# Patient Record
Sex: FEMALE | Race: WHITE | HISPANIC OR LATINO | Employment: FULL TIME | URBAN - METROPOLITAN AREA
[De-identification: names, ages, dates, MRNs, and addresses within clinical notes are randomized per-mention and may not be internally consistent; named-entity substitution may affect disease eponyms.]

---

## 2017-04-28 ENCOUNTER — GENERIC CONVERSION - ENCOUNTER (OUTPATIENT)
Dept: OTHER | Facility: OTHER | Age: 37
End: 2017-04-28

## 2017-05-11 ENCOUNTER — ALLSCRIPTS OFFICE VISIT (OUTPATIENT)
Dept: OTHER | Facility: OTHER | Age: 37
End: 2017-05-11

## 2017-12-11 ENCOUNTER — ALLSCRIPTS OFFICE VISIT (OUTPATIENT)
Dept: OTHER | Facility: OTHER | Age: 37
End: 2017-12-11

## 2017-12-11 ENCOUNTER — GENERIC CONVERSION - ENCOUNTER (OUTPATIENT)
Dept: OTHER | Facility: OTHER | Age: 37
End: 2017-12-11

## 2017-12-12 NOTE — CONSULTS
Assessment  1  Hyperlipemia (272 4) (E78 5)   2  Vasovagal syncope (780 2) (R55)   3  Family history of hyperlipidemia (V18 19) (Z83 49) : Mother    Plan  Health Maintenance, Multiple sclerosis    · EKG/ECG- POC; Status:Complete;   Done: 24ICN3196 01:14PM   Perform: In Office; Last Updated By:Lula Tobias; 12/11/2017 1:15:01 PM;Ordered;Maintenance, Multiple sclerosis; Ordered By:Jabari Mcnamara;  Hyperlipemia, Vasovagal syncope    · Follow-up PRN Evaluation and Treatment  Follow-up  Status: Complete  Done:49Owt3974   Ordered; Hyperlipemia, Vasovagal syncope; Ordered By: Jayne Owen Performed:  Due: 19COY0264    Discussion/Summary    It is my impression that the patient has an elevated LDL cholesterol  In the past it was not quite is elevated and she is now eating more judiciously and losing weight  Her LDL cholesterol is not high enough to warrant statin treatment  Her risk of myocardial infarction /stroke over the next 10 years is extremely low and less than 1%  This is mostly due to her female sex, young age, Low normal blood pressure, non smoking history and good HDL cholesterol  I do not anticipate she should need statin therapy for quite some time unless she would develop diabetes mellitus  In short I feel she should not be on statin therapy in continue measures to lose weight and avoid foods that would raise or cholesterol  We did discuss her vasovagal syncope  She has had no falls since she does have ample time to get to a safe position  Non caffeinated beverages were strongly encouraged as well as modest use of salt in her diet  I will see her on an as-needed basis  Chief Complaint  Here for evaluation of hypercholesterolemia      History of Present Illness  Cardiology HPI Free Text Note Form [facility]: The patient is a 40 year WF with a hx of syncope  She saw me in 2013 when she was pregnant for the same  She is here for evaluation of HLP and whether she should be on statins   She does urticaria and has recently been diagnosed with MS  She has had vagal episodes (especially with pain)  She did get some chest pain yesterday for 2 minutes at rest  This has never occurred before  She does get some VENTURA  She denies edema  She does have palpitations which are long standing  She does get some lightheadedness at times  She did have a normal echo in 2013      Review of Systems     Cardiac: chest pain,-- experiencing fainting/blackouts,-- palpitations present ,-- syncope/fainting-- and-- AM fatigue, but-- no heart murmur present-- and-- no signs of swelling  Skin: No complaints of nonhealing sores or skin rash  Genitourinary: difficult urination  Psychological: anxiety,-- panic attacks-- and-- difficulty concentrating  General: trouble sleeping,-- changes in weight intentional wt loss lbs-- and-- lack of energy/fatigue  Respiratory: No complaints of shortness of breath, cough with sputum, or wheezing  HEENT: serious eye problems  Gastrointestinal: No complaints of liver problems, nausea, vomiting, heartburn, constipation, bloody stools, diarrhea, problems swallowing, adbominal pain, or rectal bleeding  Hematologic: No complaints of bleeding disorders, anemia, blood clots, or excessive brusing  Neurological: numbnes,-- tingling,-- weakness,-- headaches-- and-- dizziness     ROS reviewed  Active Problems    1  Allergy Therapy   2  Dysfunctional uterine bleeding (626 8) (N93 8)   3  Dysmenorrhea (625 3) (N94 6)   4  Encounter for cervical Pap smear with pelvic exam (V76 2,V72 31) (Z01 419)   5  Encounter for gynecological examination ( 31) (Z01 419)   6  Encounter for routine gynecological examination with Papanicolaou smear of cervix (V72 31,V76 2) (Z01 419)   7  Generalized anxiety disorder (300 02) (F41 1)   8  Multiple sclerosis (340) (G35)   9  Pelvic pain in female (625 9) (R10 2)   10  Pregnancy (V22 2) (Z34 90)   11   Labor (644 00)   12   UTI (urinary tract infection) (599 0) (N39 0)    Past Medical History    The active problems and past medical history were reviewed and updated today  Surgical History   · History of Tubal Ligation    The surgical history was reviewed and updated today  Family History  Mother    · Family history of hyperlipidemia (V18 19) (Z83 49)  Maternal Grandmother    · Family history of Malignant neoplasm of breast, unspecified laterality  Paternal Grandfather    · Family history of colon cancer (V16 0) (Z80 0)  Family History Reviewed: The family history was reviewed and updated today  Social History     · Being A Social Drinker   · Never A Smoker   · Never Drank Alcohol   · Never Used Drugs  The social history was reviewed and updated today  The social history was reviewed and is unchanged  Current Meds   1  Ambien 10 MG Oral Tablet; TAKE ONE TABLET BY MOUTH AT BEDTIME AS NEEDED; Therapy: [id number] to Recorded   2  B Complex TABS Recorded   3  Baclofen TABS Recorded   4  [address] POWD Recorded   5  Cinnamon Plus Chromium CAPS Recorded   6  [address] [facility] CAPS Recorded   7  Fish Oil 1000 MG Oral Capsule Recorded   8  Gabapentin TABS; takes 300 mgs TID; Therapy: (Recorded:26Udv4647) to Recorded   9  Mirena (52 MG) 20 MCG/24HR Intrauterine Intrauterine Device Recorded   10  Nortriptyline HCl - 10 MG Oral Capsule Recorded   11  Phentermine HCl - 37 5 MG Oral Tablet; TAKE 1 TABLET DAILY AS DIRECTED; Therapy: 80IOT5255 to Recorded   12  Tecfidera 240 MG Oral Capsule Delayed Release; Take 1 capsule twice daily Recorded   13  TraMADol HCl - 50 MG Oral Tablet; TAKE 1 TABLET EVERY 4 TO 6 HOURS AS NEEDED  Recorded   14  Trokendi XR 25 MG Oral Capsule Extended Release 24 Hour; one po qd; Therapy: [id number] to Recorded   15  Vicoprofen TABS Recorded   16  Xanax 0 5 MG Oral Tablet; takes regularly one po bid; Therapy: (Recorded:32Fyr1473) to Recorded    The medication list was reviewed and updated today  Allergies  1   Sulfa Drugs    Vitals  Signs     Heart Rate: 79, Apical  Pulse Quality: Regular, Apical  Systolic: 90, RUE, Sitting  Diastolic: 64, RUE, Sitting  BP Cuff Size: Large  Height: 5 ft 2 in  Weight: 177 lb   BMI Calculated: 32 37  BSA Calculated: 1 81    Physical Exam   Constitutional  General appearance: Abnormal  -- obese young to middle aged female in NAD  Eyes  Conjunctiva and Sclera examination: Conjunctiva pink, sclera anicteric  Ears, Nose, Mouth, and Throat - Oropharynx: Clear, nares are clear, mucous membranes are moist   Neck  Neck and thyroid: Normal, supple, trachea midline, no thyromegaly  Pulmonary  Auscultation of lungs: Clear to auscultation, no rales, no rhonchi, no wheezing, good air movement  Cardiovascular  Auscultation of heart: Normal rate and rhythm, normal S1 and S2, no murmurs  Carotid pulses: Normal, 2+ bilaterally  Pedal pulses: Normal, 2+ bilaterally  Examination of extremities for edema and/or varicosities: Normal    Abdomen  Abdomen: Non-tender and no distention  Liver and spleen: No hepatomegaly or splenomegaly  Future Appointments    Date/Time Provider Specialty Site   05/15/2018 03:40 PM Feliberto Gold DO [facility]     End of Encounter Meds    1  Ambien 10 MG Oral Tablet (Zolpidem Tartrate); TAKE ONE TABLET BY MOUTH AT BEDTIME AS NEEDED; Therapy: [id number] to Recorded   2  B Complex TABS Recorded   3  Baclofen TABS Recorded   4  [address] POWD Recorded   5  Cinnamon Plus Chromium CAPS Recorded   6  [address] [facility] [address] CAPS Recorded   7  Fish Oil 1000 MG Oral Capsule Recorded   8  Gabapentin TABS; takes 300 mgs TID; Therapy: (Recorded:55Wut7166) to Recorded   9  Mirena (52 MG) 20 MCG/24HR Intrauterine Intrauterine Device Recorded   10  Nortriptyline HCl - 10 MG Oral Capsule Recorded   11  Phentermine HCl - 37 5 MG Oral Tablet; TAKE 1 TABLET DAILY AS DIRECTED; Therapy: [id number] to Recorded   12  Tecfidera 240 MG Oral Capsule Delayed Release; Take 1 capsule twice daily Recorded   13   TraMADol HCl - 50 MG Oral Tablet; TAKE 1 TABLET EVERY 4 TO 6 HOURS AS NEEDED  Recorded   14  Trokendi XR 25 MG Oral Capsule Extended Release 24 Hour; one po qd; Therapy: 69QFY6398 to Recorded   15  Vicoprofen TABS (Hydrocodone-Ibuprofen) Recorded   16  Xanax 0 5 MG Oral Tablet (ALPRAZolam); takes regularly one po bid;   Therapy: (Recorded:68Pct0442) to Recorded    Signatures   Electronically signed by : RANDY Gibbs ; Dec 11 2017  2:03PM EST                       (Author)

## 2018-01-13 VITALS
HEIGHT: 62 IN | SYSTOLIC BLOOD PRESSURE: 110 MMHG | WEIGHT: 186.06 LBS | BODY MASS INDEX: 34.24 KG/M2 | DIASTOLIC BLOOD PRESSURE: 80 MMHG

## 2018-01-16 NOTE — MISCELLANEOUS
Message  Message Free Text Note Form: Pt pages to report h o frequent utis in which Dr Neema Tillman calls in antibiotics for her  The pt reports she is urinating jose r blood  Pt advised to go to [facility] for evaluation for possible stone  Pt reports that she has MS and cannot get to an [facility] center and just wants antibiotics  Pt advised against this  She reports Dr Neema Tillman gave her his cell phone number in the event she has a UTI and she cannot find it and she just wants his cell phone number  Advised patient that I would call office to see if anyone is available to verify that she may have his cell phone  When I called the patient back, she reports that she found his cell phone and will call him and declines to go to [facility]        Signatures   Electronically signed by : RANDY Khanna ; Apr 28 2017 11:40PM EST                       (Author)

## 2018-01-16 NOTE — MISCELLANEOUS
Message  Message Free Text Note Form: e=Rx sent to [facility] for ciprofloxacin due to UTI      Plan    1  Ciprofloxacin HCl - 500 MG Oral Tablet; TAKE 1 TABLET TWICE DAILY    Signatures   Electronically signed by : Trenton Devlin DO;  Apr 28 2017  4:56PM EST                       (Author)

## 2018-01-22 VITALS
SYSTOLIC BLOOD PRESSURE: 90 MMHG | WEIGHT: 177 LBS | HEART RATE: 79 BPM | HEIGHT: 62 IN | DIASTOLIC BLOOD PRESSURE: 64 MMHG | BODY MASS INDEX: 32.57 KG/M2

## 2018-02-26 NOTE — MISCELLANEOUS
Message  Message Free Text Note Form: called pt  and she stated she used old tetracycline for UTI symptoms, no improvement, requested Rx for UTI sent Rx to [facility] for ciprofloxacin      Plan    1   Ciprofloxacin HCl - 500 MG Oral Tablet; TAKE 1 TABLET TWICE DAILY    Signatures   Electronically signed by : Clem Vazquez DO; Jan 12 2018  3:09PM EST                       (Author)

## 2019-01-01 NOTE — MISCELLANEOUS
Message  called pt  about recent pelvic u s  should be OK to insert a Mirena; pt  to inform us when she gets her next menses      Signatures   Electronically signed by : Collette Life, DO; Mar 17 2016  5:27PM EST                       (Author)
Karie Donovan  (RN)  2019 06:28:20

## 2019-04-25 ENCOUNTER — TELEPHONE (OUTPATIENT)
Dept: OBGYN CLINIC | Facility: MEDICAL CENTER | Age: 39
End: 2019-04-25

## 2019-04-26 ENCOUNTER — TELEPHONE (OUTPATIENT)
Dept: OBGYN CLINIC | Facility: MEDICAL CENTER | Age: 39
End: 2019-04-26

## 2019-04-30 ENCOUNTER — TELEPHONE (OUTPATIENT)
Dept: OBGYN CLINIC | Facility: MEDICAL CENTER | Age: 39
End: 2019-04-30

## 2019-05-14 ENCOUNTER — PROCEDURE VISIT (OUTPATIENT)
Dept: OBGYN CLINIC | Facility: MEDICAL CENTER | Age: 39
End: 2019-05-14
Payer: COMMERCIAL

## 2019-05-14 DIAGNOSIS — Z30.432 ENCOUNTER FOR REMOVAL OF INTRAUTERINE CONTRACEPTIVE DEVICE (IUD): Primary | ICD-10-CM

## 2019-05-14 PROCEDURE — 58301 REMOVE INTRAUTERINE DEVICE: CPT | Performed by: OBSTETRICS & GYNECOLOGY

## 2019-05-14 RX ORDER — VITAMIN B COMPLEX
1 TABLET ORAL DAILY
COMMUNITY
End: 2019-06-27 | Stop reason: SDUPTHER

## 2019-05-14 RX ORDER — DIMETHYL FUMARATE 240 MG/1
CAPSULE ORAL
COMMUNITY
Start: 2018-11-19

## 2019-05-14 RX ORDER — COD LIVER OIL
1 OIL (ML) ORAL DAILY
COMMUNITY

## 2019-05-14 RX ORDER — BACLOFEN 10 MG/1
TABLET ORAL
COMMUNITY
Start: 2018-12-21

## 2019-05-14 RX ORDER — CAYENNE 450 MG
CAPSULE ORAL
COMMUNITY

## 2019-05-14 RX ORDER — CHLORAL HYDRATE 500 MG
CAPSULE ORAL
COMMUNITY

## 2019-05-14 RX ORDER — BIOTIN 5 MG
1 TABLET ORAL DAILY
COMMUNITY

## 2019-05-14 RX ORDER — GABAPENTIN 300 MG/1
CAPSULE ORAL
COMMUNITY
Start: 2018-12-18

## 2019-05-14 RX ORDER — ALPRAZOLAM 0.5 MG/1
TABLET ORAL
COMMUNITY
Start: 2018-12-20

## 2019-05-21 ENCOUNTER — TELEPHONE (OUTPATIENT)
Dept: OBGYN CLINIC | Facility: MEDICAL CENTER | Age: 39
End: 2019-05-21

## 2019-06-27 ENCOUNTER — ANNUAL EXAM (OUTPATIENT)
Dept: OBGYN CLINIC | Facility: MEDICAL CENTER | Age: 39
End: 2019-06-27
Payer: COMMERCIAL

## 2019-06-27 VITALS
DIASTOLIC BLOOD PRESSURE: 74 MMHG | HEIGHT: 62 IN | SYSTOLIC BLOOD PRESSURE: 110 MMHG | WEIGHT: 188 LBS | BODY MASS INDEX: 34.6 KG/M2

## 2019-06-27 DIAGNOSIS — Z11.51 SCREENING FOR HUMAN PAPILLOMAVIRUS (HPV): ICD-10-CM

## 2019-06-27 DIAGNOSIS — Z12.31 ENCOUNTER FOR SCREENING MAMMOGRAM FOR MALIGNANT NEOPLASM OF BREAST: ICD-10-CM

## 2019-06-27 DIAGNOSIS — Z01.419 ENCOUNTER FOR ROUTINE GYNECOLOGICAL EXAMINATION WITH PAPANICOLAOU SMEAR OF CERVIX: Primary | ICD-10-CM

## 2019-06-27 DIAGNOSIS — G35 MULTIPLE SCLEROSIS (HCC): ICD-10-CM

## 2019-06-27 PROBLEM — E78.00 HYPERCHOLESTEREMIA: Status: ACTIVE | Noted: 2018-10-05

## 2019-06-27 PROBLEM — M54.16 RADICULOPATHY, LUMBAR REGION: Status: ACTIVE | Noted: 2017-10-23

## 2019-06-27 PROBLEM — M48.04 THORACIC SPINAL STENOSIS: Status: ACTIVE | Noted: 2018-10-29

## 2019-06-27 PROBLEM — Z79.899 CONTROLLED SUBSTANCE AGREEMENT SIGNED: Status: ACTIVE | Noted: 2018-01-30

## 2019-06-27 PROBLEM — M47.816 SPONDYLOSIS OF LUMBAR REGION WITHOUT MYELOPATHY OR RADICULOPATHY: Status: ACTIVE | Noted: 2017-02-06

## 2019-06-27 PROCEDURE — 99395 PREV VISIT EST AGE 18-39: CPT | Performed by: OBSTETRICS & GYNECOLOGY

## 2019-06-27 RX ORDER — VITAMIN B COMPLEX
1 TABLET ORAL DAILY
Qty: 100 CAPSULE | Refills: 3 | Status: SHIPPED | OUTPATIENT
Start: 2019-06-27

## 2019-07-01 LAB
CLINICAL INFO: NORMAL
CYTO CVX: NORMAL
CYTOLOGY CMNT CVX/VAG CYTO-IMP: NORMAL
DATE PREVIOUS BX: NORMAL
HPV E6+E7 MRNA CVX QL NAA+PROBE: NOT DETECTED
LMP START DATE: NORMAL
SL AMB PREV. PAP:: NORMAL
SPECIMEN SOURCE CVX/VAG CYTO: NORMAL

## 2022-09-01 ENCOUNTER — OFFICE VISIT (OUTPATIENT)
Dept: OBGYN CLINIC | Facility: CLINIC | Age: 42
End: 2022-09-01
Payer: COMMERCIAL

## 2022-09-01 VITALS
DIASTOLIC BLOOD PRESSURE: 70 MMHG | SYSTOLIC BLOOD PRESSURE: 108 MMHG | WEIGHT: 177 LBS | HEIGHT: 62 IN | BODY MASS INDEX: 32.57 KG/M2

## 2022-09-01 DIAGNOSIS — Z01.419 ENCOUNTER FOR WELL WOMAN EXAM WITH ROUTINE GYNECOLOGICAL EXAM: Primary | ICD-10-CM

## 2022-09-01 DIAGNOSIS — Z12.31 ENCOUNTER FOR SCREENING MAMMOGRAM FOR MALIGNANT NEOPLASM OF BREAST: ICD-10-CM

## 2022-09-01 DIAGNOSIS — N93.9 ABNORMAL UTERINE BLEEDING (AUB): ICD-10-CM

## 2022-09-01 DIAGNOSIS — Z12.31 ENCOUNTER FOR SCREENING MAMMOGRAM FOR HIGH-RISK PATIENT: ICD-10-CM

## 2022-09-01 PROCEDURE — G0145 SCR C/V CYTO,THINLAYER,RESCR: HCPCS | Performed by: OBSTETRICS & GYNECOLOGY

## 2022-09-01 PROCEDURE — G0476 HPV COMBO ASSAY CA SCREEN: HCPCS | Performed by: OBSTETRICS & GYNECOLOGY

## 2022-09-01 PROCEDURE — S0610 ANNUAL GYNECOLOGICAL EXAMINA: HCPCS | Performed by: OBSTETRICS & GYNECOLOGY

## 2022-09-01 RX ORDER — RENAGEL 800 MG/1
TABLET ORAL
COMMUNITY
Start: 2022-08-02

## 2022-09-01 NOTE — PROGRESS NOTES
[facility] [address] [place] [address]    ASSESSMENT/PLAN: Mc Wu is a 40 y o  [id number] who presents for annual gynecologic exam     Encounter for routine gynecologic examination  - Routine well woman exam completed today  - Cervical Cancer Screening: Current ASCCP Guidelines reviewed  Last Pap: 06/27/2019   Next Pap Due: today  tubal  - Breast Cancer Screening: Last Mammogram Not on file, mammo and ABUS ordered    Additional problems addressed at this visit:  1  Encounter for well woman exam with routine gynecological exam  -     Liquid-based pap, screening    2  Abnormal uterine bleeding (AUB)  -     US pelvis complete w transvaginal; Future; Expected date: 45/51/8168  -     Follicle stimulating hormone; Future  -     Estradiol; Future  -     Luteinizing hormone; Future    3  Encounter for screening mammogram for high-risk patient    4  Encounter for screening mammogram for malignant neoplasm of breast  -     Mammo screening bilateral w 3d & cad; Future  -     US breast screening bilateral complete (ABUS); Future        CC:  Annual Gynecologic Examination    HPI: Mc Wu is a 40 y o  [id number] who presents for annual gynecologic examination  HPI  Patient presents for annual exam, reports recent changes in her cycle  Had it every 2 weeks in May and June, then went 42 days without followed by a 13 day cycle  She states she started Noom recently and started working out more often  The following portions of the patient's history were reviewed and updated as appropriate: allergies, current medications, past family history, past medical history, obstetric history, gynecologic history, past social history, past surgical history and problem list     Review of Systems   Constitutional: Negative  HENT: Negative  Eyes: Negative  Respiratory: Negative  Cardiovascular: Negative  Gastrointestinal: Negative  Genitourinary: Positive for menstrual problem  Musculoskeletal: Negative  All other systems reviewed and are negative  Objective:  /70 (BP Location: Right arm, Patient Position: Sitting, Cuff Size: Adult)   Ht 5' 2" (1 575 m)   Wt 80 3 kg (177 lb)   LMP 08/13/2022   BMI 32 37 kg/m²    Physical Exam  Vitals reviewed  Constitutional:       General: She is not in acute distress  Appearance: She is well-developed  HENT:      Head: Normocephalic and atraumatic  Nose: Nose normal    Cardiovascular:      Rate and Rhythm: Normal rate  Pulmonary:      Effort: Pulmonary effort is normal  No respiratory distress  Chest:   Breasts: Breasts are symmetrical       Right: Normal  No mass, nipple discharge, skin change, tenderness, axillary adenopathy or supraclavicular adenopathy  Left: Normal  No mass, nipple discharge, skin change, tenderness, axillary adenopathy or supraclavicular adenopathy  Abdominal:      General: There is no distension  Palpations: Abdomen is soft  There is no mass  Tenderness: There is no abdominal tenderness  There is no guarding or rebound  Genitourinary:     General: Normal vulva  Exam position: Lithotomy position  Labia:         Right: No lesion  Left: No lesion  Urethra: No prolapse (urethral meatus normal)  Vagina: Normal  No vaginal discharge, erythema or bleeding  Cervix: Normal       Uterus: Normal        Adnexa: Right adnexa normal and left adnexa normal    Musculoskeletal:         General: Normal range of motion  Cervical back: Normal range of motion  Lymphadenopathy:      Upper Body:      Right upper body: No supraclavicular, axillary or pectoral adenopathy  Left upper body: No supraclavicular, axillary or pectoral adenopathy  Lower Body: No right inguinal adenopathy  No left inguinal adenopathy  Skin:     General: Skin is warm and dry  Neurological:      Mental Status: She is alert and oriented to person, place, and time  Psychiatric:         Behavior: Behavior normal          Thought Content:  Thought content normal          Judgment: Judgment normal              Geneva Santiago MD  [facility]  09/01/22 12:46 PM

## 2022-09-02 LAB
HPV HR 12 DNA CVX QL NAA+PROBE: NEGATIVE
HPV16 DNA CVX QL NAA+PROBE: NEGATIVE
HPV18 DNA CVX QL NAA+PROBE: NEGATIVE

## 2022-09-07 LAB
LAB AP GYN PRIMARY INTERPRETATION: NORMAL
Lab: NORMAL

## 2022-09-14 ENCOUNTER — HOSPITAL ENCOUNTER (OUTPATIENT)
Dept: ULTRASOUND IMAGING | Facility: CLINIC | Age: 42
Discharge: HOME | End: 2022-09-14
Payer: COMMERCIAL

## 2022-09-14 VITALS — BODY MASS INDEX: 32.57 KG/M2 | HEIGHT: 62 IN | WEIGHT: 177 LBS

## 2022-09-14 DIAGNOSIS — Z12.31 ENCOUNTER FOR SCREENING MAMMOGRAM FOR MALIGNANT NEOPLASM OF BREAST: ICD-10-CM

## 2022-09-14 PROCEDURE — 76641 ULTRASOUND BREAST COMPLETE: CPT

## 2022-09-20 ENCOUNTER — HOSPITAL ENCOUNTER (OUTPATIENT)
Dept: ULTRASOUND IMAGING | Facility: MEDICAL CENTER | Age: 42
Discharge: HOME | End: 2022-09-20
Payer: COMMERCIAL

## 2022-09-20 DIAGNOSIS — N93.9 ABNORMAL UTERINE BLEEDING (AUB): ICD-10-CM

## 2022-09-20 PROCEDURE — 76830 TRANSVAGINAL US NON-OB: CPT

## 2022-09-20 PROCEDURE — 76856 US EXAM PELVIC COMPLETE: CPT

## 2022-09-23 ENCOUNTER — TELEPHONE (OUTPATIENT)
Dept: OBGYN CLINIC | Facility: MEDICAL CENTER | Age: 42
End: 2022-09-23

## 2022-09-23 NOTE — TELEPHONE ENCOUNTER
Pt called regarding her u/s results, would like to know what her next steps are based on these results, due to her still having long painful periods

## 2022-09-28 ENCOUNTER — TELEPHONE (OUTPATIENT)
Dept: OBGYN CLINIC | Facility: MEDICAL CENTER | Age: 42
End: 2022-09-28

## 2022-10-03 ENCOUNTER — TELEPHONE (OUTPATIENT)
Dept: OBGYN CLINIC | Facility: CLINIC | Age: 42
End: 2022-10-03

## 2022-10-24 ENCOUNTER — TELEMEDICINE (OUTPATIENT)
Dept: OBGYN CLINIC | Facility: CLINIC | Age: 42
End: 2022-10-24
Payer: COMMERCIAL

## 2022-10-24 DIAGNOSIS — N93.9 ABNORMAL UTERINE BLEEDING (AUB): Primary | ICD-10-CM

## 2022-10-24 PROCEDURE — 99214 OFFICE O/P EST MOD 30 MIN: CPT | Performed by: OBSTETRICS & GYNECOLOGY

## 2022-10-24 RX ORDER — DROSPIRENONE AND ETHINYL ESTRADIOL 0.02-3(28)
1 KIT ORAL DAILY
Qty: 90 TABLET | Refills: 0 | Status: SHIPPED | OUTPATIENT
Start: 2022-10-24

## 2022-10-24 NOTE — PROGRESS NOTES
Virtual Regular Visit    Verification of patient location:    Patient is located in the following state in which I hold an active license [place]      Assessment/Plan:    Problem List Items Addressed This Visit    None     Visit Diagnoses     Abnormal uterine bleeding (AUB)    -  Primary    Relevant Medications    drospirenone-ethinyl estradiol (ZAHIDA) 3-0 02 MG per tablet               Reason for visit is   Chief Complaint   Patient presents with   • Menstrual Problem   • Virtual Regular Visit        Encounter provider Jaylon Lucero MD    Provider located at [facility] [address] [facility] [address]  [facility]  [address]  [place] [place] [address]  [phone]      Recent Visits  No visits were found meeting these conditions  Showing recent visits within past 7 days and meeting all other requirements  Today's Visits  Date Type Provider Dept   10/24/22 Telemedicine Jaylon Lucero MD  [facility]   Showing today's visits and meeting all other requirements  Future Appointments  No visits were found meeting these conditions  Showing future appointments within next 150 days and meeting all other requirements       The patient was identified by name and date of birth  Gabriel Troy was informed that this is a telemedicine visit and that the visit is being conducted through the [address] [employer] platform  She agrees to proceed     My office door was closed  The patient was notified the following individuals were present in the room Manpreet, [address] [place]  She acknowledged consent and understanding of privacy and security of the video platform  The patient has agreed to participate and understands they can discontinue the visit at any time  Patient is aware this is a billable service  Subjective  Gabriel Troy is a 40 y o  female for follow up of AUB  She states she continues to have irregular cycles, some are heavier than others  She states she also has increased PMS as well   She states this is ongoing since May where she was bleeding every 2 weeks then had a cycle 42 days apart  She states she used OCPs in the past, but had depression with them  She also used Aygestin and the mirena  Will do a 3 month course of OCPs       HPI     Past Medical History:   Diagnosis Date   • Abnormal Pap smear of cervix 11/2007   • Asthma 1981    From dog and pet dander   • Hidradenitis    • History of multiple sclerosis ([facility] [address] )    • Opioid abuse, in remission ([facility] [address] ) 2/2019    For MS pain no longer use       Past Surgical History:   Procedure Laterality Date   • BREAST CYST EXCISION     • CERVICAL BIOPSY  W/ LOOP ELECTRODE EXCISION     • TUBAL LIGATION     • WISDOM TOOTH EXTRACTION         Current Outpatient Medications   Medication Sig Dispense Refill   • ACIDOPHILUS LACTOBACILLUS PO Take 2 capsules by mouth daily     • Aubagio 14 MG TABS      • baclofen 10 mg tablet baclofen 10 mg tablet     • Calcium-Magnesium-Zinc 167-83-8 MG TABS Take 1 capsule by mouth daily     • Cayenne 450 MG CAPS Sarah Zhao DARION   Quantity: 0;  Refills: 0    Active     • Cholecalciferol 91253 units TABS Take 2 tablets by mouth daily     • Chromium-Cinnamon 100-500 MCG-MG CAPS Take by mouth     • Cod Liver Oil 5000-500 UNIT/5ML OIL Take 1 capsule by mouth daily     • Coenzyme Q10 (COQ10) 100 MG CAPS Take 1 capsule (100 mg total) by mouth daily 100 capsule 3   • Dimethyl Fumarate 240 MG CPDR Tecfidera 240 mg capsule,delayed release     • drospirenone-ethinyl estradiol (ZAHIDA) 3-0 02 MG per tablet Take 1 tablet by mouth daily 90 tablet 0   • gabapentin (NEURONTIN) 300 mg capsule gabapentin 300 mg capsule     • Krill Oil 1000 MG CAPS Take 1 capsule by mouth daily     • Menaquinone-7 (VITAMIN K2 PO) Take 1 tablet by mouth daily     • Omega-3 Fatty Acids (FISH OIL) 1,000 mg Take by mouth       No current facility-administered medications for this visit          Allergies   Allergen Reactions   • Pollen Extract Anaphylaxis     Cold-Uticaria    • Sulfa Antibiotics Hives and Rash     Other reaction(s): Unknown Reaction   • Acetaminophen Other (See Comments)   • Bee Venom    • Glatiramer      blisters     • Other      Dog, cat and rabbit dander       Review of Systems   Constitutional: Negative  Respiratory: Negative  Cardiovascular: Negative  Gastrointestinal: Negative  Genitourinary: Positive for menstrual problem  Musculoskeletal: Negative  All other systems reviewed and are negative  Video Exam    There were no vitals filed for this visit  Physical Exam   General: Patient appears well-developed  Patient is adequately nourished  Patient is not diaphoretic  Patient is not in distress  Neck: Visualization of the neck demonstrates no grossly visible masses  Neck mobility is not compromised, neck appears supple  HEENT: Oral mucosa appears moist  Patient does not identify palpable neck masses  Patient reports no oral tenderness or readily identifiable masses  Eyes: Conjunctivae appear normal bilaterally  Right eye with no discharge  Left eye with no discharge  No evidence of scleral icterus  No evidence of strabismus  Respiratory: Respiratory effort appears normal  There is no respiratory distress  Patient able to speak in full sentences  There was no audible stridor or cough  Abdomen: Patient states her abdomen is soft  States abdomen is non-tender  States abdomen is non-distended  Patient denies visible or palpable bulges to suggest hernias  Musculoskeletal: Patient reports and I can confirm no visible deformities in 4 extremities  Patient reports and I can confirm full mobility in 4 extremities  There is no grossly visible limb edema  There is no evidence of clubbing or peripheral cyanosis  Neurologic: Patient is fully alert and responsive  Patient is oriented to time, place and person  Gross evaluation of CNs III-IV-VI-VII-VIII and XI demonstrates no deficits  Patient reports normal gait and balance    Skin: My evaluation of exposed skin areas reveals no evidence of pallor  My evaluation of exposed skin areas reveals no obvious rashes  My evaluation of exposed skin areas reveals no grossly visible lesions  My evaluation of exposed skin areas reveals no evidence of erythema  Psychiatric / Behavioral: Patient's mood and affect appears normal  Patient's judgement is preserved  Patient is coherent and thought content appears directionally and contextually appropriate for age and health status

## 2023-01-20 ENCOUNTER — TELEPHONE (OUTPATIENT)
Dept: OBGYN CLINIC | Facility: MEDICAL CENTER | Age: 43
End: 2023-01-20

## 2023-01-20 NOTE — TELEPHONE ENCOUNTER
Pt wants to know when her surgery will be scheduled since Jason Rose her on 1/11/2023 saying the  would get a hold of her  Please advise, Thank you

## 2023-01-30 NOTE — PROGRESS NOTES
Virtual Regular Visit    Verification of patient location:    Patient is located in the following state in which I hold an active license [place]      Assessment/Plan:    Problem List Items Addressed This Visit    None  Visit Diagnoses     Abnormal uterine bleeding (AUB)    -  Primary               Reason for visit is   Chief Complaint   Patient presents with   • Menstrual Problem   • Virtual Regular Visit        Encounter provider Peace Smart MD    Provider located at [address] [place] [address]   [id number]  [facility] [address] [phone]      Recent Visits  No visits were found meeting these conditions  Showing recent visits within past 7 days and meeting all other requirements  Today's Visits  Date Type Provider Dept   02/02/23 Telemedicine Peace Smart MD [facility]   Showing today's visits and meeting all other requirements  Future Appointments  No visits were found meeting these conditions  Showing future appointments within next 150 days and meeting all other requirements       The patient was identified by name and date of birth  Devendra Madden was informed that this is a telemedicine visit and that the visit is being conducted through the [employer]  She agrees to proceed     My office door was closed  No one else was in the room  She acknowledged consent and understanding of privacy and security of the video platform  The patient has agreed to participate and understands they can discontinue the visit at any time  Patient is aware this is a billable service  Subjective  Devendra Madden is a 40 y o  female for discussion of upcoming procedure  States her bleeding is exacerbating her MS  She continues to have irregular and prolonged bleeding  Is is currently scheduled for May 8 for [facility]  [place]  and hysteroscopy         HPI     Past Medical History:   Diagnosis Date   • Abnormal Pap smear of cervix 11/2007   • Asthma 1981 From dog and pet dander   • Hidradenitis    • History of multiple sclerosis ([facility] [address] )    • Opioid abuse, in remission ([facility] [address] ) 2/2019    For MS pain no longer use       Past Surgical History:   Procedure Laterality Date   • BREAST CYST EXCISION     • CERVICAL BIOPSY  W/ LOOP ELECTRODE EXCISION     • TUBAL LIGATION     • WISDOM TOOTH EXTRACTION         Current Outpatient Medications   Medication Sig Dispense Refill   • ACIDOPHILUS LACTOBACILLUS PO Take 2 capsules by mouth daily     • ALPRAZolam (XANAX) 0 5 mg tablet Take by mouth daily at bedtime as needed for anxiety     • baclofen 10 mg tablet baclofen 10 mg tablet     • Calcium-Magnesium-Zinc 167-83-8 MG TABS Take 1 capsule by mouth daily     • Cayenne 450 MG CAPS Cayenne POWD   Quantity: 0;  Refills: 0    Active     • Cholecalciferol 16672 units TABS Take 2 tablets by mouth daily     • Chromium-Cinnamon 100-500 MCG-MG CAPS Take by mouth     • Cod Liver Oil 5000-500 UNIT/5ML OIL Take 1 capsule by mouth daily     • Coenzyme Q10 (COQ10) 100 MG CAPS Take 1 capsule (100 mg total) by mouth daily 100 capsule 3   • Dimethyl Fumarate 240 MG CPDR Tecf[employer]a 240 mg capsule,delayed release     • Krill Oil 1000 MG CAPS Take 1 capsule by mouth daily     • Menaquinone-7 (VITAMIN K2 PO) Take 1 tablet by mouth daily     • Omega-3 Fatty Acids (FISH OIL) 1,000 mg Take by mouth       No current facility-administered medications for this visit  Allergies   Allergen Reactions   • Dog Epithelium Allergy Skin Test Hives   • Pollen Extract Anaphylaxis     Cold-Uticaria    • Sulfa Antibiotics Hives and Rash     Other reaction(s): Unknown Reaction   • Acetaminophen Other (See Comments)   • Bee Venom    • Dimethyl Fumarate Hives and Other (See Comments)   • Glatiramer      blisters     • Meloxicam Other (See Comments)   • Other      Dog, cat and rabbit dander       Review of Systems   Constitutional: Negative  HENT: Negative  Eyes: Negative  Respiratory: Negative      Cardiovascular: Negative  Gastrointestinal: Negative  Genitourinary: Positive for menstrual problem  Musculoskeletal: Negative  All other systems reviewed and are negative  Video Exam    There were no vitals filed for this visit  Physical Exam   General: Patient appears well-developed  Patient is adequately nourished  Patient is not diaphoretic  Patient is not in distress  Neck: Visualization of the neck demonstrates no grossly visible masses  Neck mobility is not compromised, neck appears supple  HEENT: Oral mucosa appears moist  Patient does not identify palpable neck masses  Patient reports no oral tenderness or readily identifiable masses  Eyes: Conjunctivae appear normal bilaterally  Right eye with no discharge  Left eye with no discharge  No evidence of scleral icterus  No evidence of strabismus  Respiratory: Respiratory effort appears normal  There is no respiratory distress  Patient able to speak in full sentences  There was no audible stridor or cough  Abdomen: Patient states her abdomen is soft  States abdomen is non-tender  States abdomen is non-distended  Patient denies visible or palpable bulges to suggest hernias  Musculoskeletal: Patient reports and I can confirm no visible deformities in 4 extremities  Patient reports and I can confirm full mobility in 4 extremities  There is no grossly visible limb edema  There is no evidence of clubbing or peripheral cyanosis  Neurologic: Patient is fully alert and responsive  Patient is oriented to time, place and person  Gross evaluation of CNs III-IV-VI-VII-VIII and XI demonstrates no deficits  Patient reports normal gait and balance  Skin: My evaluation of exposed skin areas reveals no evidence of pallor  My evaluation of exposed skin areas reveals no obvious rashes  My evaluation of exposed skin areas reveals no grossly visible lesions  My evaluation of exposed skin areas reveals no evidence of erythema    Psychiatric / Behavioral: Patient's mood and affect appears normal  Patient's judgement is preserved  Patient is coherent and thought content appears directionally and contextually appropriate for age and health status

## 2023-02-02 ENCOUNTER — TELEMEDICINE (OUTPATIENT)
Dept: OBGYN CLINIC | Facility: CLINIC | Age: 43
End: 2023-02-02

## 2023-02-02 DIAGNOSIS — N93.9 ABNORMAL UTERINE BLEEDING (AUB): Primary | ICD-10-CM

## 2023-02-02 RX ORDER — ALPRAZOLAM 0.5 MG/1
TABLET ORAL
COMMUNITY

## 2023-02-20 ENCOUNTER — TELEPHONE (OUTPATIENT)
Dept: OBGYN CLINIC | Facility: MEDICAL CENTER | Age: 43
End: 2023-02-20

## 2023-02-20 NOTE — TELEPHONE ENCOUNTER
----- Message from Josue Stoll MD sent at 2/2/2023  3:10 PM EST -----  Can we add her on for a day that i'm at the hospital? If not, no mike

## 2023-02-21 ENCOUNTER — CONSULT (OUTPATIENT)
Dept: OBGYN CLINIC | Facility: MEDICAL CENTER | Age: 43
End: 2023-02-21

## 2023-02-21 VITALS
DIASTOLIC BLOOD PRESSURE: 80 MMHG | HEIGHT: 62 IN | BODY MASS INDEX: 33.31 KG/M2 | SYSTOLIC BLOOD PRESSURE: 124 MMHG | WEIGHT: 181 LBS

## 2023-02-21 DIAGNOSIS — N93.9 ABNORMAL UTERINE BLEEDING (AUB): Primary | ICD-10-CM

## 2023-02-21 RX ORDER — RENAGEL 800 MG/1
TABLET ORAL DAILY
COMMUNITY
Start: 2018-11-14

## 2023-02-21 NOTE — H&P
Assessment /Plan:     40 y o  [id number] with AUB for history and physical for diagnostic hysteroscopy, D&C, NovaSure endometrial ablation  All risks of the procedure were discussed with Brady Alvarado in detail  The risks include but are not limited to infection, bleeding, transfusion, damage to adjacent organs/nerves requiring immediate and/or delayed repair, clots inside blood vessels, need to complete procedure using alternative approach, procedural failure, worsening of pre-exisiting medical condition, and death were reviewed with patient  All alternatives to the surgery were discussed  Brady Alvarado desires to proceed with Above procedure at [facility] on February 27, 2023  Consent was reviewed in detail and signed today  Surgery folder reviewed with patient in detail  Preoperative testing and antibiotics were discussed and ordered  Postoperative course discussed and post op medications were reviewed  A prescription for medication for postoperative pain was not given today  Chorhexidine body wash given and instructions reviewed with patient  CC: "Prolonged menstrual bleeding"    HPI:  Pt is a 40 y o  [id number] with Patient's last menstrual period was 01/04/2023  Presents with complaints of prolonged heavy menstrual bleeding  She states her bleeding is exacerbating her MS  She used oral contraceptives in the past but also patient pression  She also had Aygestin and Mirena  She trialed a 3-month course of OCPs which did not alleviate her symptoms  She is interested in [facility] [place]  and NovaSure ablation        PMHx:   Past Medical History:   Diagnosis Date   • Abnormal Pap smear of cervix 11/2007   • Asthma 1981    From dog and pet dander   • Hidradenitis    • History of multiple sclerosis ([facility] [address] )    • Opioid abuse, in remission ([facility] [address] ) 2/2019    For MS pain no longer use       PSHx:   Past Surgical History:   Procedure Laterality Date   • BREAST CYST EXCISION     • CERVICAL BIOPSY  W/ LOOP ELECTRODE EXCISION     • TUBAL LIGATION     • WISDOM TOOTH EXTRACTION         Meds:   Current Outpatient Medications:   •  ACIDOPHILUS LACTOBACILLUS PO, Take 2 capsules by mouth daily, Disp: , Rfl:   •  ALPRAZolam (XANAX) 0 5 mg tablet, Take by mouth daily at bedtime as needed for anxiety, Disp: , Rfl:   •  baclofen 10 mg tablet, baclofen 10 mg tablet, Disp: , Rfl:   •  Cayenne 450 MG CAPS, Cayenne POWD  Quantity: 0;  Refills: 0  Active, Disp: , Rfl:   •  Cholecalciferol 14005 units TABS, Take 2 tablets by mouth daily, Disp: , Rfl:   •  Chromium-Cinnamon 100-500 MCG-MG CAPS, Take by mouth, Disp: , Rfl:   •  Cod Liver Oil 5000-500 UNIT/5ML OIL, Take 1 capsule by mouth daily, Disp: , Rfl:   •  Coenzyme Q10 (COQ10) 100 MG CAPS, Take 1 capsule (100 mg total) by mouth daily, Disp: 100 capsule, Rfl: 3  •  Krill Oil 1000 MG CAPS, Take 1 capsule by mouth daily, Disp: , Rfl:   •  Menaquinone-7 (VITAMIN K2 PO), Take 1 tablet by mouth daily, Disp: , Rfl:   •  Omega-3 Fatty Acids (FISH OIL) 1,000 mg, Take by mouth, Disp: , Rfl:   •  Teriflunomide (Aubagio) 14 MG TABS, , Disp: , Rfl:       Allergies:    Allergies   Allergen Reactions   • Dog Epithelium Allergy Skin Test Hives   • Pollen Extract Anaphylaxis     Cold-Uticaria    • Sulfa Antibiotics Hives and Rash     Other reaction(s): Unknown Reaction   • Acetaminophen Other (See Comments)   • Bee Venom    • Dimethyl Fumarate Hives and Other (See Comments)   • Glatiramer      blisters     • Glatiramer Acetate Blisters, Other (See Comments) and Swelling   • Meloxicam Other (See Comments)   • Other      Dog, cat and rabbit dander       Social Hx:    Social History     Socioeconomic History   • Marital status: /Civil Union     Spouse name: None   • Number of children: None   • Years of education: None   • Highest education level: None   Occupational History   • None   Tobacco Use   • Smoking status: Never   • Smokeless tobacco: Never   Vaping Use   • Vaping Use: Never used   Substance and Sexual Activity • Alcohol use: Yes     Alcohol/week: 4 0 standard drinks     Types: 4 Glasses of wine per week     Comment: being a social drinker; per allscripts never drank alcohol   • Drug use: Yes     Types: Marijuana, Prescription   • Sexual activity: Yes     Partners: Male     Birth control/protection: Surgical     Comment: Tubal ligation   Other Topics Concern   • None   Social History Narrative   • None     Social Determinants of Health     Financial Resource Strain: Not on file   Food Insecurity: Not on file   Transportation Needs: Not on file   Physical Activity: Not on file   Stress: Not on file   Social Connections: Not on file   Intimate Partner Violence: Not on file   Housing Stability: Not on file       Ob Hx:   OB History    Para Term  AB Living   8 5 3 2 3     SAB IAB Ectopic Multiple Live Births   3              # Outcome Date GA Lbr Fer/2nd Weight Sex Delivery Anes PTL Lv   8 Term            7 Term            6      M Vag-Spont      5 SAB            4 Term     F Vag-Spont      3 SAB            2 SAB            1      M Vag-Spont          Gyn HX:  denies STD, abnormal pap, significant dysmenorrhea or irregular menses    Fm Hx:   Family History   Problem Relation Age of Onset   • Hyperlipidemia Mother    • Breast cancer Maternal Grandmother         unspecified laterality   • Breast cancer additional onset Maternal Grandmother    • Colon cancer Paternal Grandfather    • Colon cancer Father    • Cancer Father    • Diabetes Brother         Type 2       Review of Systems:  Constitutional :no fever, feels well, no tiredness, no recent weight gain or loss  ENT: no ear ache, no loss of hearing, no nosebleeds or nasal discharge, no sore throat or hoarseness  Cardiovascular: no complaints of slow or fast heart beat, no chest pain, no palpitations, no leg claudication or lower extremity edema    Respiratory: no complaints of shortness of shortness of breath, no VENTURA  Breasts:no complaints of breast pain, breast lump, or nipple discharge  Gastrointestinal: no complaints of abdominal pain, constipation,nausea, vomiting, or diarrhea or bloody stools  Genitourinary : as noted in HPI  Integumentary: no complaints of skin rash or lesion, itching or dry skin  Neurological: no complaints of headache, no confusion, no numbness or tingling, no dizziness or fainting    Objective        /80 (BP Location: Right arm)   Ht 5' 2" (1 575 m)   Wt 82 1 kg (181 lb)   LMP 01/04/2023   BMI 33 11 kg/m²     General:   appears stated age, cooperative, alert normal mood and affect   Neck: Neck: normal, supple,trachea midline, no masses   Heart: regular rate and rhythm, S1, S2 normal, no murmur, click, rub or gallop   Lungs: clear to auscultation bilaterally   Abdomen: soft, non-tender, without masses or organomegaly   Vulva: normal   Vagina: normal vagina, no discharge, exudate, lesion, or erythema   Urethra: normal   Cervix: Normal, no discharge     Uterus: normal size, contour, position, consistency, mobility, non-tender   Adnexa: normal adnexa   From prior exam

## 2023-02-21 NOTE — H&P (VIEW-ONLY)
Assessment /Plan:     40 y o  [id number] with AUB for history and physical for diagnostic hysteroscopy, D&C, NovaSure endometrial ablation  All risks of the procedure were discussed with Mayford Ganser in detail  The risks include but are not limited to infection, bleeding, transfusion, damage to adjacent organs/nerves requiring immediate and/or delayed repair, clots inside blood vessels, need to complete procedure using alternative approach, procedural failure, worsening of pre-exisiting medical condition, and death were reviewed with patient  All alternatives to the surgery were discussed  Mayford Ganser desires to proceed with Above procedure at [facility] on February 27, 2023  Consent was reviewed in detail and signed today  Surgery folder reviewed with patient in detail  Preoperative testing and antibiotics were discussed and ordered  Postoperative course discussed and post op medications were reviewed  A prescription for medication for postoperative pain was not given today  Chorhexidine body wash given and instructions reviewed with patient  CC: "Prolonged menstrual bleeding"    HPI:  Pt is a 40 y o  [id number] with Patient's last menstrual period was 01/04/2023  Presents with complaints of prolonged heavy menstrual bleeding  She states her bleeding is exacerbating her MS  She used oral contraceptives in the past but also patient pression  She also had Aygestin and Mirena  She trialed a 3-month course of OCPs which did not alleviate her symptoms  She is interested in [facility] [place]  and NovaSure ablation        PMHx:   Past Medical History:   Diagnosis Date   • Abnormal Pap smear of cervix 11/2007   • Asthma 1981    From dog and pet dander   • Hidradenitis    • History of multiple sclerosis ([facility] [address] )    • Opioid abuse, in remission ([facility] [address] ) 2/2019    For MS pain no longer use       PSHx:   Past Surgical History:   Procedure Laterality Date   • BREAST CYST EXCISION     • CERVICAL BIOPSY  W/ LOOP ELECTRODE EXCISION     • TUBAL LIGATION     • WISDOM TOOTH EXTRACTION         Meds:   Current Outpatient Medications:   •  ACIDOPHILUS LACTOBACILLUS PO, Take 2 capsules by mouth daily, Disp: , Rfl:   •  ALPRAZolam (XANAX) 0 5 mg tablet, Take by mouth daily at bedtime as needed for anxiety, Disp: , Rfl:   •  baclofen 10 mg tablet, baclofen 10 mg tablet, Disp: , Rfl:   •  Cayenne 450 MG CAPS, Cayenne POWD  Quantity: 0;  Refills: 0  Active, Disp: , Rfl:   •  Cholecalciferol 88144 units TABS, Take 2 tablets by mouth daily, Disp: , Rfl:   •  Chromium-Cinnamon 100-500 MCG-MG CAPS, Take by mouth, Disp: , Rfl:   •  Cod Liver Oil 5000-500 UNIT/5ML OIL, Take 1 capsule by mouth daily, Disp: , Rfl:   •  Coenzyme Q10 (COQ10) 100 MG CAPS, Take 1 capsule (100 mg total) by mouth daily, Disp: 100 capsule, Rfl: 3  •  Krill Oil 1000 MG CAPS, Take 1 capsule by mouth daily, Disp: , Rfl:   •  Menaquinone-7 (VITAMIN K2 PO), Take 1 tablet by mouth daily, Disp: , Rfl:   •  Omega-3 Fatty Acids (FISH OIL) 1,000 mg, Take by mouth, Disp: , Rfl:   •  Teriflunomide (Aubagio) 14 MG TABS, , Disp: , Rfl:       Allergies:    Allergies   Allergen Reactions   • Dog Epithelium Allergy Skin Test Hives   • Pollen Extract Anaphylaxis     Cold-Uticaria    • Sulfa Antibiotics Hives and Rash     Other reaction(s): Unknown Reaction   • Acetaminophen Other (See Comments)   • Bee Venom    • Dimethyl Fumarate Hives and Other (See Comments)   • Glatiramer      blisters     • Glatiramer Acetate Blisters, Other (See Comments) and Swelling   • Meloxicam Other (See Comments)   • Other      Dog, cat and rabbit dander       Social Hx:    Social History     Socioeconomic History   • Marital status: /Civil Union     Spouse name: None   • Number of children: None   • Years of education: None   • Highest education level: None   Occupational History   • None   Tobacco Use   • Smoking status: Never   • Smokeless tobacco: Never   Vaping Use   • Vaping Use: Never used   Substance and Sexual Activity • Alcohol use: Yes     Alcohol/week: 4 0 standard drinks     Types: 4 Glasses of wine per week     Comment: being a social drinker; per allscripts never drank alcohol   • Drug use: Yes     Types: Marijuana, Prescription   • Sexual activity: Yes     Partners: Male     Birth control/protection: Surgical     Comment: Tubal ligation   Other Topics Concern   • None   Social History Narrative   • None     Social Determinants of Health     Financial Resource Strain: Not on file   Food Insecurity: Not on file   Transportation Needs: Not on file   Physical Activity: Not on file   Stress: Not on file   Social Connections: Not on file   Intimate Partner Violence: Not on file   Housing Stability: Not on file       Ob Hx:   OB History    Para Term  AB Living   8 5 3 2 3     SAB IAB Ectopic Multiple Live Births   3              # Outcome Date GA Lbr Fer/2nd Weight Sex Delivery Anes PTL Lv   8 Term            7 Term            6      M Vag-Spont      5 SAB            4 Term     F Vag-Spont      3 SAB            2 SAB            1      M Vag-Spont          Gyn HX:  denies STD, abnormal pap, significant dysmenorrhea or irregular menses    Fm Hx:   Family History   Problem Relation Age of Onset   • Hyperlipidemia Mother    • Breast cancer Maternal Grandmother         unspecified laterality   • Breast cancer additional onset Maternal Grandmother    • Colon cancer Paternal Grandfather    • Colon cancer Father    • Cancer Father    • Diabetes Brother         Type 2       Review of Systems:  Constitutional :no fever, feels well, no tiredness, no recent weight gain or loss  ENT: no ear ache, no loss of hearing, no nosebleeds or nasal discharge, no sore throat or hoarseness  Cardiovascular: no complaints of slow or fast heart beat, no chest pain, no palpitations, no leg claudication or lower extremity edema    Respiratory: no complaints of shortness of shortness of breath, no VENTURA  Breasts:no complaints of breast pain, breast lump, or nipple discharge  Gastrointestinal: no complaints of abdominal pain, constipation,nausea, vomiting, or diarrhea or bloody stools  Genitourinary : as noted in HPI  Integumentary: no complaints of skin rash or lesion, itching or dry skin  Neurological: no complaints of headache, no confusion, no numbness or tingling, no dizziness or fainting    Objective        /80 (BP Location: Right arm)   Ht 5' 2" (1 575 m)   Wt 82 1 kg (181 lb)   LMP 01/04/2023   BMI 33 11 kg/m²     General:   appears stated age, cooperative, alert normal mood and affect   Neck: Neck: normal, supple,trachea midline, no masses   Heart: regular rate and rhythm, S1, S2 normal, no murmur, click, rub or gallop   Lungs: clear to auscultation bilaterally   Abdomen: soft, non-tender, without masses or organomegaly   Vulva: normal   Vagina: normal vagina, no discharge, exudate, lesion, or erythema   Urethra: normal   Cervix: Normal, no discharge     Uterus: normal size, contour, position, consistency, mobility, non-tender   Adnexa: normal adnexa   From prior exam

## 2023-02-21 NOTE — PATIENT INSTRUCTIONS
Pre-Surgery Instructions:   Medication Instructions    ACIDOPHILUS LACTOBACILLUS PO per anesthesia guidelines     ALPRAZolam (XANAX) 0 5 mg tablet per anesthesia guidelines     baclofen 10 mg tablet per anesthesia guidelines     Cayenne 450 MG CAPS per anesthesia guidelines     Cholecalciferol 76987 units TABS per anesthesia guidelines     Chromium-Cinnamon 100-500 MCG-MG CAPS per anesthesia guidelines     Cod Liver Oil 5000-500 UNIT/5ML OIL per anesthesia guidelines     Coenzyme Q10 (COQ10) 100 MG CAPS per anesthesia guidelines     Krill Oil 1000 MG CAPS per anesthesia guidelines     Menaquinone-7 (VITAMIN K2 PO) per anesthesia guidelines     Omega-3 Fatty Acids (FISH OIL) 1,000 mg per anesthesia guidelines     Teriflunomide (Aubagio) 14 MG TABS per anesthesia guidelines

## 2023-02-22 ENCOUNTER — ANESTHESIA EVENT (OUTPATIENT)
Dept: PERIOP | Facility: HOSPITAL | Age: 43
End: 2023-02-22

## 2023-02-22 NOTE — PRE-PROCEDURE INSTRUCTIONS
Pre-Surgery Instructions:   Medication Instructions   • ACIDOPHILUS LACTOBACILLUS PO Hold from now till after procedure      • ALPRAZolam Mickeal Beards) 0 5 mg tablet May use day of surgery if needed     • baclofen 10 mg tablet May use day of surgery if needed     • Cayenne 450 MG CAPS Hold from now till after procedure    • Chromium-Cinnamon 100-500 MCG-MG CAPS Hold from now till after procedure    • Cod Liver Oil 5000-500 UNIT/5ML OIL Hold from now till after procedure    • Coenzyme Q10 (COQ10) 100 MG CAPS Hold from now till after procedure    • Krill Oil 1000 MG CAPS Hold from now till after procedure    • Omega-3 Fatty Acids (FISH OIL) 1,000 mg Hold from now till after procedure    • Teriflunomide (Aubagio) 14 MG TABS Take this medication day of surgery if normally taken in the morning  • [DISCONTINUED] Cholecalciferol 56770 units TABS       Pt takes Vit d 5000units daily- hold now till after procedure  My Surgical Experience    The following information was developed to assist you to prepare for your operation  What do I need to do before coming to the hospital?  • Arrange for a responsible person to drive you to and from the hospital   • Arrange care for your children at home  Children are not allowed in the recovery areas of the hospital  • Plan to wear clothing that is easy to put on and take off  If you are having shoulder surgery, wear a shirt that buttons or zippers in the front  Bathing  o Shower the evening before and the morning of your surgery with an antibacterial soap  Please refer to the Pre Op Showering Instructions for Surgery Patients Sheet   o Remove nail polish and all body piercing jewelry  o Do not shave any body part for at least 24 hours before surgery-this includes face, arms, legs and upper body  Food  o Nothing to eat or drink after midnight the night before your surgery   This includes candy and chewing gum  o Exception: If your surgery is after 12:00pm (noon), you may have clear liquids such as 7-Up®, ginger ale, apple or cranberry juice, Jell-O®, water, or clear broth until 8:00 am  o Do not drink milk or juice with pulp on the morning before surgery  o Do not drink alcohol 24 hours before surgery  Medicine  o Follow instructions you received from your surgeon about which medicines you may take on the day of surgery  o If instructed to take medicine on the morning of surgery, take pills with just a small sip of water  Call your prescribing doctor for specific infroamtion on what to do if you take insulin    What should I bring to the hospital?    Bring:  • Crutches or a walker, if you have them, for foot or knee surgery  • A list of the daily medicines, vitamins, minerals, herbals and nutritional supplements you take  Include the dosages of medicines and the time you take them each day  • Glasses, dentures or hearing aids  • Minimal clothing; you will be wearing hospital sleepwear  • Photo ID; required to verify your identity  • If you have a Living Will or Power of , bring a copy of the documents  • If you have an ostomy, bring an extra pouch and any supplies you use    Do not bring  • Medicines or inhalers  • Money, valuables or jewelry    What other information should I know about the day of surgery? • Notify your surgeons if you develop a cold, sore throat, cough, fever, rash or any other illness  • Report to the Ambulatory Surgical/Same Day Surgery Unit  • You will be instructed to stop at Registration only if you have not been pre-registered  • Inform your  fi they do not stay that they will be asked by the staff to leave a phone number where they can be reached  • Be available to be reached before surgery   In the event the operating room schedule changes, you may be asked to come in earlier or later than expected    *It is important to tell your doctor and others involved in your health care if you are taking or have been taking any non-prescription drugs, vitamins, minerals, herbals or other nutritional supplements   Any of these may interact with some food or medicines and cause a reaction

## 2023-02-24 ENCOUNTER — TELEPHONE (OUTPATIENT)
Dept: OBGYN CLINIC | Facility: MEDICAL CENTER | Age: 43
End: 2023-02-24

## 2023-02-27 ENCOUNTER — ANESTHESIA (OUTPATIENT)
Dept: PERIOP | Facility: HOSPITAL | Age: 43
End: 2023-02-27

## 2023-02-27 ENCOUNTER — HOSPITAL ENCOUNTER (OUTPATIENT)
Facility: HOSPITAL | Age: 43
Setting detail: OUTPATIENT SURGERY
Discharge: HOME | End: 2023-02-27
Attending: OBSTETRICS & GYNECOLOGY | Admitting: OBSTETRICS & GYNECOLOGY

## 2023-02-27 VITALS
WEIGHT: 180.78 LBS | BODY MASS INDEX: 33.27 KG/M2 | RESPIRATION RATE: 16 BRPM | TEMPERATURE: 98.1 F | SYSTOLIC BLOOD PRESSURE: 136 MMHG | DIASTOLIC BLOOD PRESSURE: 64 MMHG | OXYGEN SATURATION: 96 % | HEIGHT: 62 IN | HEART RATE: 63 BPM

## 2023-02-27 DIAGNOSIS — N93.9 ABNORMAL UTERINE BLEEDING (AUB): ICD-10-CM

## 2023-02-27 PROBLEM — Z98.890 S/P ENDOMETRIAL ABLATION: Status: ACTIVE | Noted: 2023-02-27

## 2023-02-27 PROBLEM — Z98.890 S/P DILATION AND CURETTAGE: Status: ACTIVE | Noted: 2023-02-27

## 2023-02-27 LAB
BASOPHILS # BLD AUTO: 0.06 THOUSANDS/ÂΜL (ref 0–0.1)
BASOPHILS NFR BLD AUTO: 1 % (ref 0–1)
EOSINOPHIL # BLD AUTO: 0.15 THOUSAND/ÂΜL (ref 0–0.61)
EOSINOPHIL NFR BLD AUTO: 2 % (ref 0–6)
ERYTHROCYTE [DISTWIDTH] IN BLOOD BY AUTOMATED COUNT: 14.1 % (ref 11.6–15.1)
EXT PREGNANCY TEST URINE: NEGATIVE
EXT. CONTROL: NORMAL
HCT VFR BLD AUTO: 40 % (ref 34.8–46.1)
HGB BLD-MCNC: 12.6 G/DL (ref 11.5–15.4)
IMM GRANULOCYTES # BLD AUTO: 0.01 THOUSAND/UL (ref 0–0.2)
IMM GRANULOCYTES NFR BLD AUTO: 0 % (ref 0–2)
LYMPHOCYTES # BLD AUTO: 2.46 THOUSANDS/ÂΜL (ref 0.6–4.47)
LYMPHOCYTES NFR BLD AUTO: 38 % (ref 14–44)
MCH RBC QN AUTO: 28.4 PG (ref 26.8–34.3)
MCHC RBC AUTO-ENTMCNC: 31.5 G/DL (ref 31.4–37.4)
MCV RBC AUTO: 90 FL (ref 82–98)
MONOCYTES # BLD AUTO: 0.62 THOUSAND/ÂΜL (ref 0.17–1.22)
MONOCYTES NFR BLD AUTO: 10 % (ref 4–12)
NEUTROPHILS # BLD AUTO: 3.12 THOUSANDS/ÂΜL (ref 1.85–7.62)
NEUTS SEG NFR BLD AUTO: 49 % (ref 43–75)
NRBC BLD AUTO-RTO: 0 /100 WBCS
PLATELET # BLD AUTO: 244 THOUSANDS/UL (ref 149–390)
PMV BLD AUTO: 12 FL (ref 8.9–12.7)
RBC # BLD AUTO: 4.43 MILLION/UL (ref 3.81–5.12)
WBC # BLD AUTO: 6.42 THOUSAND/UL (ref 4.31–10.16)

## 2023-02-27 RX ORDER — SODIUM CHLORIDE 9 MG/ML
125 INJECTION, SOLUTION INTRAVENOUS CONTINUOUS
Status: DISCONTINUED | OUTPATIENT
Start: 2023-02-27 | End: 2023-02-27 | Stop reason: HOSPADM

## 2023-02-27 RX ORDER — KETOROLAC TROMETHAMINE 30 MG/ML
INJECTION, SOLUTION INTRAMUSCULAR; INTRAVENOUS AS NEEDED
Status: DISCONTINUED | OUTPATIENT
Start: 2023-02-27 | End: 2023-02-27

## 2023-02-27 RX ORDER — HYDROMORPHONE HCL/PF 1 MG/ML
0.5 SYRINGE (ML) INJECTION
Status: COMPLETED | OUTPATIENT
Start: 2023-02-27 | End: 2023-02-27

## 2023-02-27 RX ORDER — DEXAMETHASONE SODIUM PHOSPHATE 10 MG/ML
INJECTION, SOLUTION INTRAMUSCULAR; INTRAVENOUS AS NEEDED
Status: DISCONTINUED | OUTPATIENT
Start: 2023-02-27 | End: 2023-02-27

## 2023-02-27 RX ORDER — SODIUM CHLORIDE, SODIUM LACTATE, POTASSIUM CHLORIDE, CALCIUM CHLORIDE 600; 310; 30; 20 MG/100ML; MG/100ML; MG/100ML; MG/100ML
125 INJECTION, SOLUTION INTRAVENOUS CONTINUOUS
Status: DISCONTINUED | OUTPATIENT
Start: 2023-02-27 | End: 2023-02-27 | Stop reason: HOSPADM

## 2023-02-27 RX ORDER — PROMETHAZINE HYDROCHLORIDE 25 MG/ML
12.5 INJECTION, SOLUTION INTRAMUSCULAR; INTRAVENOUS EVERY 4 HOURS PRN
Status: DISCONTINUED | OUTPATIENT
Start: 2023-02-27 | End: 2023-02-27 | Stop reason: HOSPADM

## 2023-02-27 RX ORDER — GLYCOPYRROLATE 0.2 MG/ML
INJECTION INTRAMUSCULAR; INTRAVENOUS AS NEEDED
Status: DISCONTINUED | OUTPATIENT
Start: 2023-02-27 | End: 2023-02-27

## 2023-02-27 RX ORDER — LIDOCAINE HYDROCHLORIDE 20 MG/ML
INJECTION, SOLUTION EPIDURAL; INFILTRATION; INTRACAUDAL; PERINEURAL AS NEEDED
Status: DISCONTINUED | OUTPATIENT
Start: 2023-02-27 | End: 2023-02-27

## 2023-02-27 RX ORDER — DEXMEDETOMIDINE HYDROCHLORIDE 100 UG/ML
INJECTION, SOLUTION INTRAVENOUS AS NEEDED
Status: DISCONTINUED | OUTPATIENT
Start: 2023-02-27 | End: 2023-02-27

## 2023-02-27 RX ORDER — ONDANSETRON 2 MG/ML
INJECTION INTRAMUSCULAR; INTRAVENOUS AS NEEDED
Status: DISCONTINUED | OUTPATIENT
Start: 2023-02-27 | End: 2023-02-27

## 2023-02-27 RX ORDER — ONDANSETRON 2 MG/ML
4 INJECTION INTRAMUSCULAR; INTRAVENOUS EVERY 6 HOURS PRN
Status: DISCONTINUED | OUTPATIENT
Start: 2023-02-27 | End: 2023-02-27 | Stop reason: HOSPADM

## 2023-02-27 RX ORDER — HYDROMORPHONE HCL/PF 1 MG/ML
0.5 SYRINGE (ML) INJECTION
Status: DISCONTINUED | OUTPATIENT
Start: 2023-02-27 | End: 2023-02-27 | Stop reason: HOSPADM

## 2023-02-27 RX ORDER — PROPOFOL 10 MG/ML
INJECTION, EMULSION INTRAVENOUS CONTINUOUS PRN
Status: DISCONTINUED | OUTPATIENT
Start: 2023-02-27 | End: 2023-02-27

## 2023-02-27 RX ORDER — SODIUM CHLORIDE 9 MG/ML
INJECTION, SOLUTION INTRAVENOUS AS NEEDED
Status: DISCONTINUED | OUTPATIENT
Start: 2023-02-27 | End: 2023-02-27 | Stop reason: HOSPADM

## 2023-02-27 RX ORDER — IBUPROFEN 600 MG/1
600 TABLET ORAL EVERY 6 HOURS PRN
Status: DISCONTINUED | OUTPATIENT
Start: 2023-02-27 | End: 2023-02-27 | Stop reason: HOSPADM

## 2023-02-27 RX ORDER — MIDAZOLAM HYDROCHLORIDE 2 MG/2ML
INJECTION, SOLUTION INTRAMUSCULAR; INTRAVENOUS AS NEEDED
Status: DISCONTINUED | OUTPATIENT
Start: 2023-02-27 | End: 2023-02-27

## 2023-02-27 RX ORDER — PROPOFOL 10 MG/ML
INJECTION, EMULSION INTRAVENOUS AS NEEDED
Status: DISCONTINUED | OUTPATIENT
Start: 2023-02-27 | End: 2023-02-27

## 2023-02-27 RX ADMIN — DEXMEDETOMIDINE HCL 8 MCG: 100 INJECTION INTRAVENOUS at 14:43

## 2023-02-27 RX ADMIN — KETOROLAC TROMETHAMINE 30 MG: 30 INJECTION, SOLUTION INTRAMUSCULAR; INTRAVENOUS at 14:45

## 2023-02-27 RX ADMIN — GLYCOPYRROLATE 0.2 MG: 0.2 INJECTION INTRAMUSCULAR; INTRAVENOUS at 14:24

## 2023-02-27 RX ADMIN — ONDANSETRON 4 MG: 2 INJECTION INTRAMUSCULAR; INTRAVENOUS at 14:18

## 2023-02-27 RX ADMIN — IBUPROFEN 600 MG: 600 TABLET, FILM COATED ORAL at 17:13

## 2023-02-27 RX ADMIN — Medication 25 MG: at 14:22

## 2023-02-27 RX ADMIN — Medication 25 MG: at 14:30

## 2023-02-27 RX ADMIN — MIDAZOLAM 2 MG: 1 INJECTION INTRAMUSCULAR; INTRAVENOUS at 14:13

## 2023-02-27 RX ADMIN — PROPOFOL 200 MG: 10 INJECTION, EMULSION INTRAVENOUS at 14:15

## 2023-02-27 RX ADMIN — DEXMEDETOMIDINE HCL 8 MCG: 100 INJECTION INTRAVENOUS at 14:37

## 2023-02-27 RX ADMIN — PROPOFOL 50 MG: 10 INJECTION, EMULSION INTRAVENOUS at 14:22

## 2023-02-27 RX ADMIN — SODIUM CHLORIDE, SODIUM LACTATE, POTASSIUM CHLORIDE, AND CALCIUM CHLORIDE 125 ML/HR: .6; .31; .03; .02 INJECTION, SOLUTION INTRAVENOUS at 13:04

## 2023-02-27 RX ADMIN — MIDAZOLAM 2 MG: 1 INJECTION INTRAMUSCULAR; INTRAVENOUS at 14:08

## 2023-02-27 RX ADMIN — HYDROMORPHONE HYDROCHLORIDE 0.5 MG: 1 INJECTION, SOLUTION INTRAMUSCULAR; INTRAVENOUS; SUBCUTANEOUS at 16:16

## 2023-02-27 RX ADMIN — PROPOFOL 150 MCG/KG/MIN: 10 INJECTION, EMULSION INTRAVENOUS at 14:15

## 2023-02-27 RX ADMIN — HYDROMORPHONE HYDROCHLORIDE 0.5 MG: 1 INJECTION, SOLUTION INTRAMUSCULAR; INTRAVENOUS; SUBCUTANEOUS at 15:39

## 2023-02-27 RX ADMIN — PROPOFOL 50 MG: 10 INJECTION, EMULSION INTRAVENOUS at 14:30

## 2023-02-27 RX ADMIN — DEXAMETHASONE SODIUM PHOSPHATE 8 MG: 10 INJECTION INTRAMUSCULAR; INTRAVENOUS at 14:18

## 2023-02-27 RX ADMIN — DEXMEDETOMIDINE HCL 8 MCG: 100 INJECTION INTRAVENOUS at 14:33

## 2023-02-27 RX ADMIN — HYDROMORPHONE HYDROCHLORIDE 0.5 MG: 1 INJECTION, SOLUTION INTRAMUSCULAR; INTRAVENOUS; SUBCUTANEOUS at 15:24

## 2023-02-27 RX ADMIN — LIDOCAINE HYDROCHLORIDE 100 MG: 20 INJECTION, SOLUTION EPIDURAL; INFILTRATION; INTRACAUDAL; PERINEURAL at 14:15

## 2023-02-27 NOTE — INTERVAL H&P NOTE
H&P reviewed  After examining the patient I find no changes in the patients condition since the H&P had been written      Vitals:    02/27/23 1225   BP: 132/79   Pulse: (!) 53   Resp: 16   Temp: 98 1 °F (36 7 °C)   SpO2: 98%

## 2023-02-27 NOTE — OP NOTE
OPERATIVE REPORT  PATIENT NAME: Wendy Burgess    :  1980  MRN: [id number]  Pt Location: [facility] OR ROOM 04    SURGERY DATE: 2023    Surgeon(s) and Role:     * Roly Pruitt MD - Primary     * Sammie Dhaliwal MD - Assisting    Preop Diagnosis:  Abnormal uterine bleeding (AUB) [N93 9]    Post-Op Diagnosis Codes:     * Abnormal uterine bleeding (AUB) [N93 9]    Procedure(s):  (D&C) W/ HYSTEROSCOPY  ABLATION ENDOMETRIAL NOVASURE    Specimen(s):  ID Type Source Tests Collected by Time Destination   1 : [facility] Tissue Endometrium TISSUE EXAM Roly Pruitt MD 2023 1429        Estimated Blood Loss:   Minimal    Drains:  None    Anesthesia Type:   General    Operative Indications:  Abnormal uterine bleeding (AUB) [N93 9]      Operative Findings:  1  External genitalia grossly normal in appearance  No ulcerations, no lacerations, no lesions  2   Vagina and cervix were  grossly normal in appearance without any lacerations or lesions  3  Uterus sounded to 8 cm  4  Hysteroscopic examination revealed normal appearing endometrial lining  Polyp tissues was noted at approximately 7o'clock position  Complications:   None apparent    Procedure and Technique:  Patient was identified in the preop holding area as well as the operating suite  Procedure was reviewed and patient consented verbally once again  She underwent successful induction of general anesthesia using LMA  She was placed in the dorsal lithotomy position using yellowfin stirrups  She had pneumatic compression boots in place  She had a chloroprep vaginal prep and was draped in sterile fashion  An operative timeout was accomplished  The bladder was drained using a straight catheter  The anterior lip of the cervix was grasped using a single-tooth tenaculum  The endocervix was dilated to accommodate the 7 5 mm operating hysteroscope  Sharp curetting was performed and sent for pathology      Novasure  The NovaSure ablation device was inserted through the cervix and seated into the endometrial cavity  Device was deployed and rotated in all directions to maximize expansion of the bipolar electrode  Uterine length was determined to be 4 cm and uterine width was determined to be 2 8 cm  A test of the system was performed and the uterine cavity was insufflated with CO2 to ensure cavity integrity and proper placement of the device  No leakage of gas was appreciated  After successful testing, the Novasure system was activated and bipolar cauterization with a Moisture Transport Vacuum System facilitated ablation of the endometrium in approximately 116 seconds under 62 veronica of power  The Novasure system was completely retracted prior to it's removal from the uterine cavity  Inspection of the bipolar electrode showed evidence of burnt endometrial tissue  The tenaculum was removed  The patient had excellent hemostasis at this time  She was cleansed and was awakened from anesthesia without incident and was taken to the recovery room in satisfactory condition  Dr Shayy Grady was present and participated in the entire surgery     I was present for the entire procedure    Patient Disposition:  PACU         SIGNATURE: Guanako Antonio MD  DATE: February 27, 2023  TIME: 3:00 PM

## 2023-02-27 NOTE — ANESTHESIA POSTPROCEDURE EVALUATION
Post-Op Assessment Note    CV Status:  Stable  Pain Score: 2    Pain management: adequate     Mental Status:  Alert and awake   Hydration Status:  Euvolemic   PONV Controlled:  Controlled   Airway Patency:  Patent      Post Op Vitals Reviewed: Yes      Staff: Anesthesiologist         No notable events documented      /75 (02/27/23 1612)    Temp     Pulse 58 (02/27/23 1612)   Resp 15 (02/27/23 1612)    SpO2 93 % (02/27/23 1612)

## 2023-02-27 NOTE — ANESTHESIA PREPROCEDURE EVALUATION
Procedure:  (D&C) W/ HYSTEROSCOPY (Uterus)  ABLATION ENDOMETRIAL NOVASURE (Uterus)    Relevant Problems   CARDIO   (+) Hypercholesteremia   (+) Migraine      MUSCULOSKELETAL   (+) Lumbosacral spondylosis   (+) Spondylosis of lumbar region without myelopathy or radiculopathy   (+) Spondylosis of thoracic region without myelopathy or radiculopathy      NEURO/PSYCH   (+) Anxiety state   (+) Generalized anxiety disorder   (+) Migraine   (+) PTSD (post-traumatic stress disorder)      PULMONARY   (+) Mild intermittent asthma without complication      Nervous and Auditory   (+) Multiple sclerosis (HCC)      Other   (+) Adjustment disorder with anxious mood   (+) Vasovagal episode             Anesthesia Plan  ASA Score- 2     Anesthesia Type- general with ASA Monitors  Additional Monitors:   Airway Plan: LMA  Comment: Does NOT want fentanyl, because of nausea concerns  Hx of bradycardia and vasovagal episodes          Plan Factors-    Chart reviewed  Existing labs reviewed  Patient summary reviewed  Patient is a current smoker  Patient instructed to abstain from smoking on day of procedure  Patient did not smoke on day of surgery  There is medical exclusion for perioperative obstructive sleep apnea risk education  Induction- intravenous  Postoperative Plan-     Informed Consent- Anesthetic plan and risks discussed with patient and spouse Damon Palacios

## 2023-03-06 ENCOUNTER — TELEPHONE (OUTPATIENT)
Dept: OBGYN CLINIC | Facility: CLINIC | Age: 43
End: 2023-03-06

## 2023-03-06 ENCOUNTER — TELEPHONE (OUTPATIENT)
Dept: OBGYN CLINIC | Facility: MEDICAL CENTER | Age: 43
End: 2023-03-06

## 2023-03-06 NOTE — TELEPHONE ENCOUNTER
Let pt know that Dr Anne Valdez has not been able to result her results yet and as soon as she does someone will call her

## 2023-03-17 ENCOUNTER — OFFICE VISIT (OUTPATIENT)
Dept: OBGYN CLINIC | Facility: CLINIC | Age: 43
End: 2023-03-17

## 2023-03-17 VITALS
WEIGHT: 183 LBS | HEIGHT: 62 IN | DIASTOLIC BLOOD PRESSURE: 70 MMHG | SYSTOLIC BLOOD PRESSURE: 120 MMHG | BODY MASS INDEX: 33.68 KG/M2

## 2023-03-17 DIAGNOSIS — Z09 POSTOPERATIVE EXAMINATION: Primary | ICD-10-CM

## 2023-03-17 NOTE — PROGRESS NOTES
Nupur Rhodes is a 44 y o  female who presents to the clinic 2 weeks status post Novasure for abnormal uterine bleeding  Eating a regular diet without difficulty  Bowel movements are normal  The patient is not having any pain  The following portions of the patient's history were reviewed and updated as appropriate: allergies, current medications, past family history, past medical history, past social history, past surgical history and problem list     Review of Systems  Pertinent items are noted in HPI  Objective     /70 (BP Location: Right arm)   Ht 5' 2" (1 575 m)   Wt 83 kg (183 lb)   BMI 33 47 kg/m²   General:  alert and oriented, in no acute distress         Assessment      Doing well postoperatively  Operative findings again reviewed  Pathology report discussed  Plan     1  Continue any current medications  2  Wound care discussed  3  Activity restrictions: none  4  Anticipated return to work: not applicable    5  Follow up:  Routine annual exam

## 2023-03-30 ENCOUNTER — HOSPITAL ENCOUNTER (OUTPATIENT)
Dept: MAMMOGRAPHY | Facility: MEDICAL CENTER | Age: 43
Discharge: HOME | End: 2023-03-30

## 2023-03-30 VITALS — HEIGHT: 62 IN | BODY MASS INDEX: 33.67 KG/M2 | WEIGHT: 182.98 LBS

## 2023-03-30 DIAGNOSIS — Z12.31 ENCOUNTER FOR SCREENING MAMMOGRAM FOR MALIGNANT NEOPLASM OF BREAST: ICD-10-CM

## 2023-11-07 ENCOUNTER — PATIENT MESSAGE (OUTPATIENT)
Dept: OBGYN CLINIC | Facility: CLINIC | Age: 43
End: 2023-11-07

## 2023-11-07 DIAGNOSIS — R41.840 POOR CONCENTRATION: ICD-10-CM

## 2023-11-07 DIAGNOSIS — R41.89 BRAIN FOG: ICD-10-CM

## 2023-11-07 DIAGNOSIS — R45.86 MOOD SWINGS: Primary | ICD-10-CM

## 2023-11-20 ENCOUNTER — APPOINTMENT (OUTPATIENT)
Dept: LAB | Facility: CLINIC | Age: 43
End: 2023-11-20
Payer: COMMERCIAL

## 2023-11-20 ENCOUNTER — ANNUAL EXAM (OUTPATIENT)
Dept: OBGYN CLINIC | Facility: CLINIC | Age: 43
End: 2023-11-20
Payer: COMMERCIAL

## 2023-11-20 VITALS
BODY MASS INDEX: 32.35 KG/M2 | SYSTOLIC BLOOD PRESSURE: 120 MMHG | HEIGHT: 62 IN | WEIGHT: 175.8 LBS | DIASTOLIC BLOOD PRESSURE: 75 MMHG

## 2023-11-20 DIAGNOSIS — Z12.31 ENCOUNTER FOR SCREENING MAMMOGRAM FOR MALIGNANT NEOPLASM OF BREAST: ICD-10-CM

## 2023-11-20 DIAGNOSIS — R45.86 MOOD SWINGS: ICD-10-CM

## 2023-11-20 DIAGNOSIS — Z01.419 ENCOUNTER FOR WELL WOMAN EXAM WITH ROUTINE GYNECOLOGICAL EXAM: Primary | ICD-10-CM

## 2023-11-20 LAB
25(OH)D3 SERPL-MCNC: 34.2 NG/ML (ref 30–100)
ANION GAP SERPL CALCULATED.3IONS-SCNC: 7 MMOL/L
BASOPHILS # BLD AUTO: 0.06 THOUSANDS/ÂΜL (ref 0–0.1)
BASOPHILS NFR BLD AUTO: 1 % (ref 0–1)
BUN SERPL-MCNC: 13 MG/DL (ref 5–25)
CALCIUM SERPL-MCNC: 9.2 MG/DL (ref 8.4–10.2)
CHLORIDE SERPL-SCNC: 107 MMOL/L (ref 96–108)
CHOLEST SERPL-MCNC: 256 MG/DL
CO2 SERPL-SCNC: 26 MMOL/L (ref 21–32)
CORTIS SERPL-MCNC: 6.2 UG/DL
CREAT SERPL-MCNC: 0.6 MG/DL (ref 0.6–1.3)
CRP SERPL HS-MCNC: 3.55 MG/L
EOSINOPHIL # BLD AUTO: 0.17 THOUSAND/ÂΜL (ref 0–0.61)
EOSINOPHIL NFR BLD AUTO: 2 % (ref 0–6)
ERYTHROCYTE [DISTWIDTH] IN BLOOD BY AUTOMATED COUNT: 14.1 % (ref 11.6–15.1)
EST. AVERAGE GLUCOSE BLD GHB EST-MCNC: 111 MG/DL
ESTRADIOL SERPL-MCNC: 55.5 PG/ML
FERRITIN SERPL-MCNC: 119 NG/ML (ref 11–307)
FSH SERPL-ACNC: 6.6 MIU/ML
GFR SERPL CREATININE-BSD FRML MDRD: 112 ML/MIN/1.73SQ M
GLUCOSE P FAST SERPL-MCNC: 89 MG/DL (ref 65–99)
HBA1C MFR BLD: 5.5 %
HCT VFR BLD AUTO: 40.7 % (ref 34.8–46.1)
HCYS SERPL-SCNC: 5.8 UMOL/L (ref 5–15)
HDLC SERPL-MCNC: 67 MG/DL
HGB BLD-MCNC: 12.9 G/DL (ref 11.5–15.4)
IMM GRANULOCYTES # BLD AUTO: 0.02 THOUSAND/UL (ref 0–0.2)
IMM GRANULOCYTES NFR BLD AUTO: 0 % (ref 0–2)
INSULIN SERPL-ACNC: 5.78 UIU/ML (ref 1.9–23)
LDLC SERPL CALC-MCNC: 176 MG/DL (ref 0–100)
LH SERPL-ACNC: 4.7 MIU/ML
LYMPHOCYTES # BLD AUTO: 2.12 THOUSANDS/ÂΜL (ref 0.6–4.47)
LYMPHOCYTES NFR BLD AUTO: 29 % (ref 14–44)
MCH RBC QN AUTO: 29.3 PG (ref 26.8–34.3)
MCHC RBC AUTO-ENTMCNC: 31.7 G/DL (ref 31.4–37.4)
MCV RBC AUTO: 92 FL (ref 82–98)
MONOCYTES # BLD AUTO: 0.63 THOUSAND/ÂΜL (ref 0.17–1.22)
MONOCYTES NFR BLD AUTO: 9 % (ref 4–12)
NEUTROPHILS # BLD AUTO: 4.38 THOUSANDS/ÂΜL (ref 1.85–7.62)
NEUTS SEG NFR BLD AUTO: 59 % (ref 43–75)
NONHDLC SERPL-MCNC: 189 MG/DL
NRBC BLD AUTO-RTO: 0 /100 WBCS
PLATELET # BLD AUTO: 235 THOUSANDS/UL (ref 149–390)
PMV BLD AUTO: 12.6 FL (ref 8.9–12.7)
POTASSIUM SERPL-SCNC: 3.6 MMOL/L (ref 3.5–5.3)
PROGEST SERPL-MCNC: 2.64 NG/ML
RBC # BLD AUTO: 4.41 MILLION/UL (ref 3.81–5.12)
SODIUM SERPL-SCNC: 140 MMOL/L (ref 135–147)
T3FREE SERPL-MCNC: 3.02 PG/ML (ref 2.5–3.9)
T4 FREE SERPL-MCNC: 0.78 NG/DL (ref 0.61–1.12)
TESTOST SERPL-MSCNC: 23 NG/DL
TRIGL SERPL-MCNC: 65 MG/DL
TSH SERPL DL<=0.05 MIU/L-ACNC: 1.06 UIU/ML (ref 0.45–4.5)
URATE SERPL-MCNC: 3.9 MG/DL (ref 2–7.5)
VIT B12 SERPL-MCNC: 643 PG/ML (ref 180–914)
WBC # BLD AUTO: 7.38 THOUSAND/UL (ref 4.31–10.16)

## 2023-11-20 PROCEDURE — 83525 ASSAY OF INSULIN: CPT

## 2023-11-20 PROCEDURE — 99396 PREV VISIT EST AGE 40-64: CPT | Performed by: OBSTETRICS & GYNECOLOGY

## 2023-11-20 PROCEDURE — 85025 COMPLETE CBC W/AUTO DIFF WBC: CPT

## 2023-11-20 PROCEDURE — 80048 BASIC METABOLIC PNL TOTAL CA: CPT

## 2023-11-20 PROCEDURE — 82306 VITAMIN D 25 HYDROXY: CPT

## 2023-11-20 PROCEDURE — 84144 ASSAY OF PROGESTERONE: CPT

## 2023-11-20 PROCEDURE — 84439 ASSAY OF FREE THYROXINE: CPT

## 2023-11-20 PROCEDURE — 82607 VITAMIN B-12: CPT

## 2023-11-20 PROCEDURE — 82728 ASSAY OF FERRITIN: CPT

## 2023-11-20 PROCEDURE — 83090 ASSAY OF HOMOCYSTEINE: CPT

## 2023-11-20 PROCEDURE — 36415 COLL VENOUS BLD VENIPUNCTURE: CPT

## 2023-11-20 PROCEDURE — 83036 HEMOGLOBIN GLYCOSYLATED A1C: CPT

## 2023-11-20 PROCEDURE — 84403 ASSAY OF TOTAL TESTOSTERONE: CPT

## 2023-11-20 PROCEDURE — 82172 ASSAY OF APOLIPOPROTEIN: CPT

## 2023-11-20 PROCEDURE — 83002 ASSAY OF GONADOTROPIN (LH): CPT

## 2023-11-20 PROCEDURE — 83001 ASSAY OF GONADOTROPIN (FSH): CPT

## 2023-11-20 PROCEDURE — 82627 DEHYDROEPIANDROSTERONE: CPT

## 2023-11-20 PROCEDURE — 84443 ASSAY THYROID STIM HORMONE: CPT

## 2023-11-20 PROCEDURE — 82670 ASSAY OF TOTAL ESTRADIOL: CPT

## 2023-11-20 PROCEDURE — 82533 TOTAL CORTISOL: CPT

## 2023-11-20 PROCEDURE — 80061 LIPID PANEL: CPT

## 2023-11-20 PROCEDURE — 86141 C-REACTIVE PROTEIN HS: CPT

## 2023-11-20 PROCEDURE — 84481 FREE ASSAY (FT-3): CPT

## 2023-11-20 PROCEDURE — 84550 ASSAY OF BLOOD/URIC ACID: CPT

## 2023-11-20 RX ORDER — DIMETHYL FUMARATE 240 MG/1
CAPSULE ORAL
COMMUNITY
End: 2023-11-20

## 2023-11-20 NOTE — PROGRESS NOTES
[facility] [address] [place] [address]    ASSESSMENT/PLAN: Sawyer Hardin is a 44 y.o. [id number] who presents for annual gynecologic exam.    Encounter for routine gynecologic examination  - Routine well woman exam completed today. - Cervical Cancer Screening: Current ASCCP Guidelines reviewed. Last Pap: 09/01/2022 . Next Pap Due: 2025, every 3 years  tubal  - Breast Cancer Screening: Last Mammogram 03/30/2023, mammo and ABUS ordered    Additional problems addressed at this visit:  1. Encounter for well woman exam with routine gynecological exam    2. Encounter for screening mammogram for malignant neoplasm of breast  -     Mammo screening bilateral w 3d & cad; Future; Expected date: 03/31/2024          CC: Annual Gynecologic Examination    HPI: Sawyer Hardin is a 44 y.o. [id number] who presents for annual gynecologic examination. Gynecologic Exam      Patient presents for annual exam, doing well since ablation  Working with functional practitioner, labs ordered     The following portions of the patient's history were reviewed and updated as appropriate: allergies, current medications, past family history, past medical history, obstetric history, gynecologic history, past social history, past surgical history and problem list.    Review of Systems   Constitutional: Negative. HENT: Negative. Eyes: Negative. Respiratory: Negative. Cardiovascular: Negative. Gastrointestinal: Negative. Genitourinary: Negative. Musculoskeletal: Negative. All other systems reviewed and are negative. Objective:  /75   Ht 5' 2" (1.575 m)   Wt 79.7 kg (175 lb 12.8 oz)   BMI 32.15 kg/m²    Physical Exam  Vitals reviewed. Constitutional:       General: She is not in acute distress. Appearance: She is well-developed. HENT:      Head: Normocephalic and atraumatic. Nose: Nose normal.   Cardiovascular:      Rate and Rhythm: Normal rate.    Pulmonary:      Effort: Pulmonary effort is normal. No respiratory distress. Chest:   Breasts:     Breasts are symmetrical.      Right: Normal. No mass, nipple discharge, skin change or tenderness. Left: Normal. No mass, nipple discharge, skin change or tenderness. Abdominal:      General: There is no distension. Palpations: Abdomen is soft. There is no mass. Tenderness: There is no abdominal tenderness. There is no guarding or rebound. Genitourinary:     General: Normal vulva. Exam position: Lithotomy position. Labia:         Right: No lesion. Left: No lesion. Urethra: No prolapse (urethral meatus normal). Vagina: Normal. No vaginal discharge, erythema or bleeding. Cervix: Normal.      Uterus: Normal.       Adnexa: Right adnexa normal and left adnexa normal.   Musculoskeletal:         General: Normal range of motion. Cervical back: Normal range of motion. Lymphadenopathy:      Upper Body:      Right upper body: No supraclavicular, axillary or pectoral adenopathy. Left upper body: No supraclavicular, axillary or pectoral adenopathy. Lower Body: No right inguinal adenopathy. No left inguinal adenopathy. Skin:     General: Skin is warm and dry. Neurological:      Mental Status: She is alert and oriented to person, place, and time. Psychiatric:         Behavior: Behavior normal.         Thought Content:  Thought content normal.         Judgment: Judgment normal.             Selina Damon MD  [facility]  11/20/23 11:04 AM

## 2023-11-21 LAB — DHEA-S SERPL-MCNC: 144 UG/DL (ref 57.3–279.2)

## 2023-11-22 LAB — APO B SERPL-MCNC: 113 MG/DL

## (undated) DEVICE — PVC URETHRAL CATHETER: Brand: DOVER

## (undated) DEVICE — GLOVE INDICATOR PI UNDERGLOVE SZ 7.5 BLUE

## (undated) DEVICE — 2000CC GUARDIAN II: Brand: GUARDIAN

## (undated) DEVICE — PREMIUM DRY TRAY LF: Brand: MEDLINE INDUSTRIES, INC.

## (undated) DEVICE — NOVASURE ADVANCED DEVICE

## (undated) DEVICE — DRAPE EQUIPMENT RF WAND

## (undated) DEVICE — GLOVE PI ULTRA TOUCH SZ.7.0

## (undated) DEVICE — UNDER BUTTOCKS DRAPE W/FLUID CONTROL POUCH: Brand: CONVERTORS

## (undated) DEVICE — IV EXTENSION TUBING 33 IN

## (undated) DEVICE — CYSTO TUBING SINGLE IRRIGATION

## (undated) DEVICE — STRL ALLENTOWN HYSTEROSCOPY PK: Brand: CARDINAL HEALTH

## (undated) DEVICE — SCD SEQUENTIAL COMPRESSION COMFORT SLEEVE MEDIUM KNEE LENGTH: Brand: KENDALL SCD